# Patient Record
Sex: FEMALE | Race: WHITE | Employment: STUDENT | URBAN - METROPOLITAN AREA
[De-identification: names, ages, dates, MRNs, and addresses within clinical notes are randomized per-mention and may not be internally consistent; named-entity substitution may affect disease eponyms.]

---

## 2017-08-02 RX ORDER — LEVONORGESTREL AND ETHINYL ESTRADIOL 0.1-0.02MG
KIT ORAL
Qty: 28 TABLET | Refills: 0 | Status: SHIPPED | OUTPATIENT
Start: 2017-08-02 | End: 2018-07-06 | Stop reason: ALTCHOICE

## 2017-08-18 ENCOUNTER — OFFICE VISIT (OUTPATIENT)
Dept: OBGYN CLINIC | Facility: CLINIC | Age: 22
End: 2017-08-18

## 2017-08-18 VITALS
SYSTOLIC BLOOD PRESSURE: 106 MMHG | BODY MASS INDEX: 24.71 KG/M2 | HEART RATE: 101 BPM | HEIGHT: 63.75 IN | WEIGHT: 143 LBS | DIASTOLIC BLOOD PRESSURE: 68 MMHG

## 2017-08-18 DIAGNOSIS — N94.6 DYSMENORRHEA: ICD-10-CM

## 2017-08-18 DIAGNOSIS — Z30.41 ENCOUNTER FOR SURVEILLANCE OF CONTRACEPTIVE PILLS: ICD-10-CM

## 2017-08-18 DIAGNOSIS — Z01.419 WELL WOMAN EXAM WITH ROUTINE GYNECOLOGICAL EXAM: Primary | ICD-10-CM

## 2017-08-18 DIAGNOSIS — Z11.3 SCREEN FOR STD (SEXUALLY TRANSMITTED DISEASE): ICD-10-CM

## 2017-08-18 DIAGNOSIS — Z12.4 CERVICAL CANCER SCREENING: ICD-10-CM

## 2017-08-18 DIAGNOSIS — N92.1 BREAKTHROUGH BLEEDING ON BIRTH CONTROL PILLS: ICD-10-CM

## 2017-08-18 PROCEDURE — 99395 PREV VISIT EST AGE 18-39: CPT | Performed by: NURSE PRACTITIONER

## 2017-08-18 RX ORDER — LEVONORGESTREL AND ETHINYL ESTRADIOL 0.15-0.03
1 KIT ORAL DAILY
Qty: 1 PACKAGE | Refills: 11 | Status: SHIPPED | OUTPATIENT
Start: 2017-08-18 | End: 2018-07-06

## 2017-08-18 NOTE — PROGRESS NOTES
Here for Routine Annual Exam  On current OCP since 12/2016 and had 2 months with breakthrough bleeding. Menses regular, normal flow but still notes bad cramps. No C/O    ROS: No Cardiac, Respiratory, GI,  or Neurological symptoms.     PE:  GENERAL: well

## 2017-08-19 LAB
T4, FREE: 0.9 NG/DL (ref 0.8–1.8)
TSH: 1.44 MIU/L

## 2017-08-24 LAB
CHLAMYDIA TRACHOMATIS$RNA, TMA: NOT DETECTED
NEISSERIA GONORRHOEAE$RNA, TMA: NOT DETECTED

## 2018-07-30 NOTE — TELEPHONE ENCOUNTER
Pt calling back and moved to [place]    Pt needs 3 months to go to [facility] in [place]

## 2018-07-30 NOTE — TELEPHONE ENCOUNTER
From: Fabiola Lemus  Sent: 7/28/2018 6:17 AM CDT  Subject: Medication Renewal Request    Maria Elena Yates would like a refill of the following medications:     Levonorgestrel-Ethinyl Estrad (LEVORA 0.15/30, 28,) 0.15-30 MG-MCG Oral Tab Adal Garland

## 2018-07-30 NOTE — TELEPHONE ENCOUNTER
Patient is due for annual after 08/18/18. Please schedule annual.  Will send over 2 month supply to pharmacy. Also, can you get patient's preferred pharmacy as this is not in the system.

## 2018-07-31 RX ORDER — LEVONORGESTREL AND ETHINYL ESTRADIOL 0.15-0.03
1 KIT ORAL DAILY
Qty: 84 TABLET | Refills: 0 | Status: SHIPPED
Start: 2018-07-31